# Patient Record
Sex: FEMALE | Race: WHITE | NOT HISPANIC OR LATINO | Employment: FULL TIME | URBAN - METROPOLITAN AREA
[De-identification: names, ages, dates, MRNs, and addresses within clinical notes are randomized per-mention and may not be internally consistent; named-entity substitution may affect disease eponyms.]

---

## 2018-10-22 ENCOUNTER — OFFICE VISIT (OUTPATIENT)
Dept: OBSTETRICS AND GYNECOLOGY | Age: 43
End: 2018-10-22

## 2018-10-22 ENCOUNTER — TRANSCRIBE ORDERS (OUTPATIENT)
Dept: ADMINISTRATIVE | Facility: HOSPITAL | Age: 43
End: 2018-10-22

## 2018-10-22 VITALS
SYSTOLIC BLOOD PRESSURE: 118 MMHG | HEIGHT: 61 IN | BODY MASS INDEX: 37 KG/M2 | DIASTOLIC BLOOD PRESSURE: 80 MMHG | WEIGHT: 196 LBS

## 2018-10-22 DIAGNOSIS — Z12.4 PAP SMEAR FOR CERVICAL CANCER SCREENING: ICD-10-CM

## 2018-10-22 DIAGNOSIS — Z13.89 SCREENING FOR BLOOD OR PROTEIN IN URINE: ICD-10-CM

## 2018-10-22 DIAGNOSIS — Z01.419 WELL FEMALE EXAM WITH ROUTINE GYNECOLOGICAL EXAM: Primary | ICD-10-CM

## 2018-10-22 DIAGNOSIS — Z12.31 SCREENING MAMMOGRAM, ENCOUNTER FOR: ICD-10-CM

## 2018-10-22 DIAGNOSIS — E11.9 TYPE 2 DIABETES MELLITUS WITHOUT COMPLICATION, WITHOUT LONG-TERM CURRENT USE OF INSULIN (HCC): ICD-10-CM

## 2018-10-22 DIAGNOSIS — Z12.31 VISIT FOR SCREENING MAMMOGRAM: Primary | ICD-10-CM

## 2018-10-22 DIAGNOSIS — E66.9 OBESITY (BMI 30-39.9): ICD-10-CM

## 2018-10-22 LAB
BILIRUB BLD-MCNC: NEGATIVE MG/DL
CLARITY, POC: CLEAR
COLOR UR: YELLOW
GLUCOSE UR STRIP-MCNC: ABNORMAL MG/DL
KETONES UR QL: ABNORMAL
LEUKOCYTE EST, POC: NEGATIVE
NITRITE UR-MCNC: NEGATIVE MG/ML
PH UR: 5 [PH] (ref 5–8)
PROT UR STRIP-MCNC: NEGATIVE MG/DL
RBC # UR STRIP: NEGATIVE /UL
SP GR UR: 1.02 (ref 1–1.03)
UROBILINOGEN UR QL: NORMAL

## 2018-10-22 PROCEDURE — 99386 PREV VISIT NEW AGE 40-64: CPT | Performed by: OBSTETRICS & GYNECOLOGY

## 2018-10-22 PROCEDURE — 81002 URINALYSIS NONAUTO W/O SCOPE: CPT | Performed by: OBSTETRICS & GYNECOLOGY

## 2018-10-22 RX ORDER — DAPAGLIFLOZIN 10 MG/1
TABLET, FILM COATED ORAL DAILY
COMMUNITY
Start: 2018-09-28

## 2018-10-22 RX ORDER — GLIPIZIDE 10 MG/1
10 TABLET ORAL
COMMUNITY
Start: 2018-09-28

## 2018-10-22 RX ORDER — INFLUENZA A VIRUS A/MICHIGAN/45/2015 X-275 (H1N1) ANTIGEN (FORMALDEHYDE INACTIVATED), INFLUENZA A VIRUS A/SINGAPORE/INFIMH-16-0019/2016 IVR-186 (H3N2) ANTIGEN (FORMALDEHYDE INACTIVATED), INFLUENZA B VIRUS B/PHUKET/3073/2013 ANTIGEN (FORMALDEHYDE INACTIVATED), AND INFLUENZA B VIRUS B/MARYLAND/15/2016 BX-69A ANTIGEN (FORMALDEHYDE INACTIVATED) 15; 15; 15; 15 UG/.5ML; UG/.5ML; UG/.5ML; UG/.5ML
INJECTION, SUSPENSION INTRAMUSCULAR
COMMUNITY
Start: 2018-10-03 | End: 2021-08-21

## 2018-10-22 NOTE — PROGRESS NOTES
Routine Annual Visit    10/22/2018    Patient: Teodora Umaña          MR#:[id number]      History of Present Illness    Chief Complaint   Patient presents with   • Gynecologic Exam     Old/new patient Annual.  Last pap 12, negative. Last mammo 4/15/16, negative.        43 y.o. female  who presents for annual exam.    Patient presents for routine annual exam feeling well without major complaints.  Reportedly was in between doctors and diabetes was suboptimally managed for several years.  Patient is currently under the care of Dr. Arenas and is working on medication adjustments to establish control of her type 2 diabetes.  Her last A1c was reportedly 11.  The patient reports usually no menses since she had her ablation but periodically has light spotting monthly    No LMP recorded (lmp unknown). Patient has had an ablation.  Obstetric History:  OB History      Para Term  AB Living    3 3 2 1 0 3    SAB TAB Ectopic Molar Multiple Live Births    0         3         Menstrual History:     No LMP recorded (lmp unknown). Patient has had an ablation.       Sexual History:       ________________________________________  Patient Active Problem List   Diagnosis   • Gastroesophageal reflux disease   • Mixed hyperlipidemia   • Vitamin D deficiency   • External hemorrhoid, bleeding   • Secondary polycythemia   • Obesity (BMI 30-39.9)   • Type 2 diabetes mellitus, without long-term current use of insulin (CMS/[facility])       Past Medical History:   Diagnosis Date   • Abnormal Pap smear of cervix    • Diabetes mellitus (CMS/HCC)    • Hemorrhoids    • S/P hemorrhoidectomy 5/10/2016    May 2016       Past Surgical History:   Procedure Laterality Date   • D&C HYSTEROSCOPY ENDOMETRIAL ABLATION  2009   • HEMORRHOIDECTOMY N/A 2016    Procedure: HEMORRHOIDECTOMY;  Surgeon: Ziyad Heck MD;  [place] [facility] [place] [facility] [place]:    • LEEP  2000    SANJAY III       History   Smoking Status   • Former Smoker    • Packs/day: 0.50   • Years: 20.00   • Types: Cigarettes   • Quit date: 11/2017   Smokeless Tobacco   • Never Used       Family History   Problem Relation Age of Onset   • Heart disease Maternal Grandmother    • Stroke Maternal Grandmother    • Hypertension Maternal Grandmother    • Diabetes Maternal Grandmother    • Other Mother         PRECANCEROUS CELLS OF CERVIX.    • Hypertension Maternal Grandfather    • Heart disease Maternal Grandfather    • Diabetes Maternal Grandfather    • Hypertension Paternal Grandmother    • Heart disease Paternal Grandmother    • Diabetes Paternal Grandmother    • Hypertension Paternal Grandfather    • Heart disease Paternal Grandfather    • Diabetes Paternal Grandfather        Prior to Admission medications    Medication Sig Start Date End Date Taking? Authorizing Provider   Docusate Sodium (COLACE PO) Take 1 tablet by mouth daily as needed.   Yes Reddy Lewis MD   FARXIGA 10 MG tablet Daily. 9/28/18  Yes Reddy Lewis MD   FLUZONE QUADRIVALENT 0.5 ML suspension prefilled syringe injection  10/3/18  Yes Reddy Lewis MD   glipiZIDE (GLUCOTROL) 10 MG tablet Take 10 mg by mouth 2 (Two) Times a Day Before Meals. 9/28/18  Yes Reddy Lewis MD   VITAMIN D, CHOLECALCIFEROL, PO Take 4,000 Units by mouth daily.   Yes Reddy Lewis MD   atorvastatin (LIPITOR) 10 MG tablet Take 1 tablet by mouth Daily. 12/1/16   Teodora Obando PA   Empagliflozin (JARDIANCE) 25 MG tablet Take 25 mg by mouth every morning. 7/27/16   Teodora Obando PA   metFORMIN (GLUCOPHAGE) 500 MG tablet Take 1 tablet by mouth 2 (Two) Times a Day With Meals. 10/28/16   Teodora Obando PA   polyethylene glycol (MIRALAX) powder Take 17 g by mouth daily as needed.    Reddy Lewis MD   Probiotic Product (PROBIOTIC DAILY PO) Take 1 tablet by mouth.    Reddy Lewis MD     ________________________________________    Current contraception: vasectomy  History of abnormal  "Pap smear: no  Family history of uterine or ovarian cancer: no  Family History of colon cancer/colon polyps: no  History of abnormal mammogram: no  History of abnormal lipids: yes - management per primary MD     The following portions of the patient's history were reviewed and updated as appropriate: allergies, current medications, past family history, past medical history, past social history, past surgical history and problem list.    Review of Systems    Pertinent items are noted in HPI.     Objective   Physical Exam    /80   Ht 154.9 cm (61\")   Wt 88.9 kg (196 lb)   LMP  (LMP Unknown)   Breastfeeding? No   BMI 37.03 kg/m²    BP Readings from Last 3 Encounters:   10/22/18 118/80   10/28/16 100/80   09/09/16 100/80      Wt Readings from Last 3 Encounters:   10/22/18 88.9 kg (196 lb)   10/28/16 85.5 kg (188 lb 9.6 oz)   09/09/16 83.6 kg (184 lb 3.2 oz)        BMI: Estimated body mass index is 37.03 kg/m² as calculated from the following:    Height as of this encounter: 154.9 cm (61\").    Weight as of this encounter: 88.9 kg (196 lb).       General: alert, appears stated age and cooperative   Heart: regular rate and rhythm, S1, S2 normal, no murmur, click, rub or gallop   Lungs: clear to auscultation bilaterally   Abdomen: soft, non-tender, without masses or organomegaly   Breast: inspection negative, no nipple discharge or bleeding, no masses or nodularity palpable   Vulva: normal, Bartholin's, Urethra, Hardesty's normal   Vagina: normal mucosa, normal discharge   Cervix: no lesions and nulliparous appearance   Uterus: normal size   Adnexa: exam limited by habitus     As part of wellness and prevention, the following topics were discussed with the patient:     []  Nutrition  [x]  Physical activity/regular exercise   []  Healthy weight  []  Injury prevention  []  Smoking cessation  []  Substance misuse/abuse  []  Sexual behavior  []  STD prevention  []  Contaception  []  Dental health  []  Mental health  []  " Immunization  [x]  Encouraged SBE        Assessment:    Teodora was seen today for gynecologic exam.    Diagnoses and all orders for this visit:    Screening for blood or protein in urine  -     POC Urinalysis Dipstick    Pap smear for cervical cancer screening  -     IgP, Aptima HPV    Well female exam with routine gynecological exam  -     IgP, Aptima HPV    Obesity (BMI 30-39.9)    Type 2 diabetes mellitus without complication, without long-term current use of insulin (CMS/[facility])    Screening mammogram, encounter for  -     Mammo Screening Bilateral With CAD; Future          Plan:  No Follow-up on file.      Billy Isaacs MD  10/22/2018 12:08 PM

## 2018-10-23 ENCOUNTER — HOSPITAL ENCOUNTER (OUTPATIENT)
Dept: MAMMOGRAPHY | Facility: HOSPITAL | Age: 43
Discharge: HOME | End: 2018-10-23
Attending: OBSTETRICS & GYNECOLOGY | Admitting: OBSTETRICS & GYNECOLOGY

## 2018-10-23 DIAGNOSIS — Z12.31 VISIT FOR SCREENING MAMMOGRAM: ICD-10-CM

## 2018-10-23 PROCEDURE — 77063 BREAST TOMOSYNTHESIS BI: CPT

## 2018-10-23 PROCEDURE — 77067 SCR MAMMO BI INCL CAD: CPT

## 2018-10-24 ENCOUNTER — TELEPHONE (OUTPATIENT)
Dept: OBSTETRICS AND GYNECOLOGY | Age: 43
End: 2018-10-24

## 2018-10-24 LAB
CYTOLOGIST CVX/VAG CYTO: NORMAL
CYTOLOGY CVX/VAG DOC THIN PREP: NORMAL
DX ICD CODE: NORMAL
HIV 1 & 2 AB SER-IMP: NORMAL
HPV I/H RISK 4 DNA CVX QL PROBE+SIG AMP: NEGATIVE
OTHER STN SPEC: NORMAL
PATH REPORT.FINAL DX SPEC: NORMAL
STAT OF ADQ CVX/VAG CYTO-IMP: NORMAL

## 2018-10-24 NOTE — TELEPHONE ENCOUNTER
----- Message from Billy Isaacs MD sent at 10/24/2018  8:34 AM EDT -----  Call pt:  PAP is negative.

## 2018-10-26 ENCOUNTER — TELEPHONE (OUTPATIENT)
Dept: OBSTETRICS AND GYNECOLOGY | Age: 43
End: 2018-10-26

## 2018-10-26 NOTE — TELEPHONE ENCOUNTER
----- Message from Billy Isaacs MD sent at 10/26/2018  4:43 AM EDT -----  Call patient: Normal mammogram